# Patient Record
Sex: FEMALE | Employment: OTHER | URBAN - METROPOLITAN AREA
[De-identification: names, ages, dates, MRNs, and addresses within clinical notes are randomized per-mention and may not be internally consistent; named-entity substitution may affect disease eponyms.]

---

## 2017-03-07 ENCOUNTER — RECORDS - HEALTHEAST (OUTPATIENT)
Dept: LAB | Facility: CLINIC | Age: 77
End: 2017-03-07

## 2017-03-07 LAB
CHOLEST SERPL-MCNC: 188 MG/DL
FASTING STATUS PATIENT QL REPORTED: NORMAL
HDLC SERPL-MCNC: 65 MG/DL
LDLC SERPL CALC-MCNC: 93 MG/DL
TRIGL SERPL-MCNC: 148 MG/DL

## 2018-03-27 ENCOUNTER — RECORDS - HEALTHEAST (OUTPATIENT)
Dept: LAB | Facility: CLINIC | Age: 78
End: 2018-03-27

## 2018-03-27 LAB
ANION GAP SERPL CALCULATED.3IONS-SCNC: 13 MMOL/L (ref 5–18)
BUN SERPL-MCNC: 17 MG/DL (ref 8–28)
CALCIUM SERPL-MCNC: 10.5 MG/DL (ref 8.5–10.5)
CHLORIDE BLD-SCNC: 101 MMOL/L (ref 98–107)
CO2 SERPL-SCNC: 24 MMOL/L (ref 22–31)
CREAT SERPL-MCNC: 0.82 MG/DL (ref 0.6–1.1)
GFR SERPL CREATININE-BSD FRML MDRD: >60 ML/MIN/1.73M2
GLUCOSE BLD-MCNC: 96 MG/DL (ref 70–125)
POTASSIUM BLD-SCNC: 4 MMOL/L (ref 3.5–5)
SODIUM SERPL-SCNC: 138 MMOL/L (ref 136–145)

## 2019-01-03 ENCOUNTER — RECORDS - HEALTHEAST (OUTPATIENT)
Dept: LAB | Facility: CLINIC | Age: 79
End: 2019-01-03

## 2019-01-03 LAB
ANION GAP SERPL CALCULATED.3IONS-SCNC: 7 MMOL/L (ref 5–18)
BUN SERPL-MCNC: 17 MG/DL (ref 8–28)
CALCIUM SERPL-MCNC: 11 MG/DL (ref 8.5–10.5)
CHLORIDE BLD-SCNC: 104 MMOL/L (ref 98–107)
CO2 SERPL-SCNC: 30 MMOL/L (ref 22–31)
CREAT SERPL-MCNC: 0.83 MG/DL (ref 0.6–1.1)
GFR SERPL CREATININE-BSD FRML MDRD: >60 ML/MIN/1.73M2
GLUCOSE BLD-MCNC: 101 MG/DL (ref 70–125)
POTASSIUM BLD-SCNC: 4.6 MMOL/L (ref 3.5–5)
SODIUM SERPL-SCNC: 141 MMOL/L (ref 136–145)

## 2019-01-22 ENCOUNTER — RECORDS - HEALTHEAST (OUTPATIENT)
Dept: LAB | Facility: CLINIC | Age: 79
End: 2019-01-22

## 2019-01-22 LAB — CALCIUM SERPL-MCNC: 10.6 MG/DL (ref 8.5–10.5)

## 2019-04-01 ENCOUNTER — RECORDS - HEALTHEAST (OUTPATIENT)
Dept: LAB | Facility: CLINIC | Age: 79
End: 2019-04-01

## 2019-04-01 LAB
ANION GAP SERPL CALCULATED.3IONS-SCNC: 11 MMOL/L (ref 5–18)
BUN SERPL-MCNC: 13 MG/DL (ref 8–28)
CALCIUM SERPL-MCNC: 10.9 MG/DL (ref 8.5–10.5)
CHLORIDE BLD-SCNC: 104 MMOL/L (ref 98–107)
CO2 SERPL-SCNC: 27 MMOL/L (ref 22–31)
CREAT SERPL-MCNC: 0.77 MG/DL (ref 0.6–1.1)
GFR SERPL CREATININE-BSD FRML MDRD: >60 ML/MIN/1.73M2
GLUCOSE BLD-MCNC: 90 MG/DL (ref 70–125)
POTASSIUM BLD-SCNC: 4 MMOL/L (ref 3.5–5)
SODIUM SERPL-SCNC: 142 MMOL/L (ref 136–145)

## 2019-07-24 ENCOUNTER — RECORDS - HEALTHEAST (OUTPATIENT)
Dept: LAB | Facility: CLINIC | Age: 79
End: 2019-07-24

## 2019-07-24 LAB
ALBUMIN SERPL-MCNC: 3.9 G/DL (ref 3.5–5)
ANION GAP SERPL CALCULATED.3IONS-SCNC: 12 MMOL/L (ref 5–18)
BUN SERPL-MCNC: 12 MG/DL (ref 8–28)
CALCIUM SERPL-MCNC: 10.6 MG/DL (ref 8.5–10.5)
CALCIUM SERPL-MCNC: 10.7 MG/DL (ref 8.5–10.5)
CHLORIDE BLD-SCNC: 104 MMOL/L (ref 98–107)
CO2 SERPL-SCNC: 26 MMOL/L (ref 22–31)
CREAT SERPL-MCNC: 0.8 MG/DL (ref 0.6–1.1)
CREAT SERPL-MCNC: 0.81 MG/DL (ref 0.6–1.1)
GFR SERPL CREATININE-BSD FRML MDRD: >60 ML/MIN/1.73M2
GFR SERPL CREATININE-BSD FRML MDRD: >60 ML/MIN/1.73M2
GLUCOSE BLD-MCNC: 95 MG/DL (ref 70–125)
PHOSPHATE SERPL-MCNC: 3 MG/DL (ref 2.5–4.5)
POTASSIUM BLD-SCNC: 4.8 MMOL/L (ref 3.5–5)
PTH-INTACT SERPL-MCNC: 36 PG/ML (ref 10–86)
SODIUM SERPL-SCNC: 142 MMOL/L (ref 136–145)

## 2019-07-25 LAB — 25(OH)D3 SERPL-MCNC: 38.3 NG/ML (ref 30–80)

## 2019-10-23 ENCOUNTER — RECORDS - HEALTHEAST (OUTPATIENT)
Dept: LAB | Facility: CLINIC | Age: 79
End: 2019-10-23

## 2019-10-23 LAB
ANION GAP SERPL CALCULATED.3IONS-SCNC: 12 MMOL/L (ref 5–18)
BUN SERPL-MCNC: 12 MG/DL (ref 8–28)
CALCIUM SERPL-MCNC: 10.2 MG/DL (ref 8.5–10.5)
CALCIUM SERPL-MCNC: 10.4 MG/DL (ref 8.5–10.5)
CHLORIDE BLD-SCNC: 103 MMOL/L (ref 98–107)
CO2 SERPL-SCNC: 26 MMOL/L (ref 22–31)
CREAT SERPL-MCNC: 0.78 MG/DL (ref 0.6–1.1)
CREAT SERPL-MCNC: 0.79 MG/DL (ref 0.6–1.1)
GFR SERPL CREATININE-BSD FRML MDRD: >60 ML/MIN/1.73M2
GFR SERPL CREATININE-BSD FRML MDRD: >60 ML/MIN/1.73M2
GLUCOSE BLD-MCNC: 92 MG/DL (ref 70–125)
PHOSPHATE SERPL-MCNC: 3.3 MG/DL (ref 2.5–4.5)
POTASSIUM BLD-SCNC: 4.4 MMOL/L (ref 3.5–5)
PTH-INTACT SERPL-MCNC: 33 PG/ML (ref 10–86)
SODIUM SERPL-SCNC: 141 MMOL/L (ref 136–145)

## 2019-10-24 LAB — 25(OH)D3 SERPL-MCNC: 41.7 NG/ML (ref 30–80)

## 2020-02-18 RX ORDER — MONTELUKAST SODIUM 10 MG/1
10 TABLET ORAL EVERY EVENING
COMMUNITY

## 2020-02-18 RX ORDER — DOXAZOSIN 8 MG/1
8 TABLET ORAL DAILY
COMMUNITY

## 2020-02-18 RX ORDER — ALBUTEROL SULFATE 0.83 MG/ML
3 SOLUTION RESPIRATORY (INHALATION) EVERY 6 HOURS PRN
COMMUNITY

## 2020-02-18 RX ORDER — EPINEPHRINE 0.3 MG/.3ML
0.3 INJECTION SUBCUTANEOUS PRN
COMMUNITY

## 2020-02-18 RX ORDER — HYDROCHLOROTHIAZIDE 25 MG/1
25 TABLET ORAL DAILY
COMMUNITY

## 2020-02-18 RX ORDER — ALBUTEROL SULFATE 90 UG/1
2 AEROSOL, METERED RESPIRATORY (INHALATION) 4 TIMES DAILY
COMMUNITY

## 2020-02-18 RX ORDER — FLUTICASONE PROPIONATE 220 UG/1
1 AEROSOL, METERED RESPIRATORY (INHALATION) 2 TIMES DAILY
COMMUNITY

## 2020-02-18 NOTE — PROGRESS NOTES
MTM ENCOUNTER  SUBJECTIVE/OBJECTIVE:                           Michelle Handley is a 79 year old female coming in for an initial visit.  She was referred to me from Dr. Contreras.     Chief Complaint: Medication review.  Pt was referred for help with blood pressure medication selection.    Allergies/ADRs: Amoxicillin - Hives, Biaxin - Stom, Doxycycline - Stom, Sulfa - Hives, Serevent - Hives, Pneumovax 23, Theochron - Hives, ibuprofen, losartan - hives, ASA Baby - facial swelling  Tobacco: Never smoker  Alcohol: none  Caffeine: 1-3 cups/day of coffee  Activity: Likes to go on walks most days  PMH: Reviewed in ECW and Epic.    Medication Adherence/Access:  no issues reported, rarely forgets doses of meds.    Hypertension:   Current medications include Doxazosin 8 mg daily, hydrochlorothiazide 25 mg daily.  Patient does self-monitor BP. Home BP monitoring in range of 130-150's systolic over 80-90's diastolic.  Some systolic BP readings have been in the 110-120's but not regularly.  Has an arm BP monitor and has brought it into the clinic to compare to clinic machine.  Patient reports no current medication side effects. Discussed possible medication options for BP and retrial on losartan.  Pt would prefer to avoid higher dose of hydrochlorothiazide due to frequent urination.  Allergy consult:  Pt saw allergist on 11/26/19, Dr. Jennifer Boeckman.  She was assessed her reactions to hydrochlorothiazide, doxasozin, and losartan due to history of hives.  Pt has been on Losartan for about 6 months, and then started breaking out in hives.  The hives was ongoing for 3 months.  Pt reports she was breaking out in hives on higher doses of doxazosin 16 mg after they had stopped the losartan.  They reduced the doxazosin dose, and pt continued to break out in hives.  Pt also has intermittent angioedema, with swelling of the face.  This occurred once without any clear allergen or exposure.  Another instance was attributed to  aspirin.  Dr. Boeckmen noted the chronic hives is unlikely an allergy to both doxazosin and losartan.  Pt continued to have hives despite being off losartan for several months, and reducing the dose of doxazosin.  Dr. Boeckmen felt her diagnosis was likely chronic idiopathic urticaria, and would be beneficial to attempt a drug re-trial of losartan or try an new BP med.  She recommended the pt take allegra 180 mg or cetirizine 10 mg BID.  Hx on amlodipine, but had peripheral edema.    Chronic idiopathic urticaria:  Currently taking Cetirizine 20 mg once daily in the morning.  Pt had also been on Allergra along with this, but has GI upset with it and diarrhea.  Right now she is just taking cetirizine in the morning, hasn't tried splitting the dose.  They are looking into getting Xolair once monthly covered by insurance, but the patient is worried about risk of anaphylaxis with Xolair as well.    Patient uses hydrochortizone 1% OTC cream PRN for flare ups of the itching, this helps a lot when needed.  Patient is still having urticaria and redness.  This is the worst when she wakes up in the morning.  Pt reports her symptoms are tolerable at this time, and would prefer to added another antihistamine before trying Xolair.   Hx on allegra, benadryl, claritin.  Not very effective.     Asthma:   Current asthma medications: Short-Acting Bronchodilator: Albuterol MDI, Nebs and pt reports using a few times per year.  Rarely has to use them.  ICS - Flovent 220 mcg 1 puff(s) twice daily.  Montelukast (Singulair) 10 mg once daily. Pt rinses their mouth after using steroid inhaler.   Asthma triggers include: upper respiratory infections and exercise or sports.  Pt reports the following symptoms: none.  AAP on file: YES  ACT Total Scores 1/3/2019   ACT TOTAL SCORE (Goal Greater than or Equal to 20) 24   In the past 12 months, how many times did you visit the emergency room for your asthma without being admitted to the hospital? 0    In the past 12 months, how many times were you hospitalized overnight because of your asthma? 0        Spirometry (12/27/11):          Anaphylaxis:   Patient has an Epi-pen 0.3 injector available if needed.  Reports severe swelling and redness at site of bee sting.  Pt hasn't had to use it.    Supplements/OTCs:   Currently taking a multivitamin once daily, vitamin D 1000 international unit(s) daily (isn't sure on dose).   Diet: large dinner, meat and potatoes.  Small breakfasts.  Love potatoes.  Lunch usually a salad and soup. Gets some calcium in diet with salads and occaisional ice cream.  Was on calcium supplement in the past but her calcium level was high so it was stopped.         VITAMIN D, 25-HYDROXY (screening) - 10/23/2019      NAME VALUE REFERENCE RANGE    VITAMIN D 25-HYDROXY 41.7 30.0-80.0 (ng/mL)       PARATHYROID HORM. MINERALS - 10/23/2019      NAME VALUE REFERENCE RANGE    PARATHYROID HORMONE INTACT 33 10-86 (pg/mL)    CALCIUM 10.4 8.5-10.5 (mg/dL)    PHOSPHORUS 3.3 2.5-4.5 (mg/dL)    CREATININE 0.78 0.60-1.10 (mg/dL)    GFR MDRD AF AMER >60 >60 (mL/min/1.73m2)    GFR MDRD NON AF AMER >60 >60 (mL/min/1.73m2)       BASIC METABOLIC PROFILE - 10/23/2019      NAME VALUE REFERENCE RANGE    SODIUM 141 136-145 (mmol/L)    POTASSIUM 4.4 3.5-5.0 (mmol/L)    CHLORIDE 103  (mmol/L)    CO2 26 22-31 (mmol/L)    ANION GAP, CALCULATION 12 5-18 (mmol/L)    GLUCOSE 92  (mg/dL)    CALCIUM 10.2 8.5-10.5 (mg/dL)    BLOOD UREA NITROGEN 12 8-28 (mg/dL)    CREATININE 0.79 0.60-1.10 (mg/dL)    GFR MDRD AF AMER >60 >60 (mL/min/1.73m2)    GFR MDRD NON AF AMER >60 >60 (mL/min/1.73m2)     Fasting Glucose reference range is 70-99 mg/dL per      American Diabetes Association (ADA) guidelines.    PERFORMING LAB: [facility] [address] [place] [id number]   Michelle Handley F, 1940 Accession ID: [id number]       Today's Vitals: BP (!) 162/82   Pulse 82   Wt 181 lb 9.6 oz (82.4 kg)       ASSESSMENT:                               Medication Adherence: good, no issues identified    Hypertension:   Needs Improvement. Patient is not meeting BP goal of < 140/80mmHg.  Discussed options of re-trying Losartan or higher dose of doxazosin again for BP.  Discussed that pt will need to monitor her symptoms of urticaria, hives, and watch for a severe reaction.    Could also consider an additional medication such as metoprolol ER, clonidine, or hydralazine.  All three are more likely to cause hypotension and orthostatic symptoms.  Pt's reaction/hives to losartan was not likely a drug allergy, as noted by the allergist.  Patient had urticaria and hives despite being off the losartan for months, and the symptoms flared up 6 months after starting the medication. Pt declines restarting the losartan in clinic and would prefer to do this at home.  She never had dyspnea, facial swelling, or facial symptoms when on losartan.    Will avoid calcium channel blockers due to risk of peripheral edema, similar risk with all in this drug class.    Chronic idiopathic urticaria:  Needs improvement.   Patient still having urticaria and symptoms of hives despite taking cetirizine 20 mg per day.  She had adverse effects when taking Allegra in addition to current regimen, but may be able to try alternative like loratadine 10 mg added to cetirizine.  Patient would likely benefit from taking cetirizine 10 mg twice daily instead of both doses in the morning. She may get better symptom control with splitting the dose.  If pt's urticaria and hives flare up with BP medications, she could also try adding the loratadine 10 mg.  Another option to consider would be low dose doxepin (3-6mg) at bedtime for urticaria.  Typically lower doses are needed for urticaria, and anticholinergic side effects are dose dependent.  Doses below 6mg are generally regarded as safe.    Xolair would be an option as well, but pt would like to try alternatives prior to trying  this due to risk of anaphylaxis.      Asthma:   Stable. Up to date and at goal of ACT > or equal to 20.  At next apt will have her complete updated ACT.    Anaphylaxis:   Stable.    Supplements/OTCs:  Stable.    PLAN:                              1) Start Losartan 25 mg once daily.  Monitor for symptoms of hives/urticaria flaring up.    2) Change Cetirizine to 10 mg twice daily, instead of taking both tablets in the morning.  3) We will follow up in 1 month on blood pressure and symptoms of hives.   Will recheck Scr after starting losartan at that time as well.      I spent 60 minutes with this patient today (an extra 15 minutes was spent creating the Medication Action Plan). All changes were made via verbal approval with Dr. Contreras. A copy of the visit note was provided to the patient's primary care provider.    Will follow up in 3-4 weeks.    The patient was mailed a summary of these recommendations.     Katrin Mullins, PharmD  Medication Therapy Management Pharmacist  Pager: [phone]

## 2020-02-25 ENCOUNTER — OFFICE VISIT (OUTPATIENT)
Dept: PHARMACY | Facility: PHYSICIAN GROUP | Age: 80
End: 2020-02-25
Payer: COMMERCIAL

## 2020-02-25 VITALS — HEART RATE: 82 BPM | SYSTOLIC BLOOD PRESSURE: 162 MMHG | WEIGHT: 181.6 LBS | DIASTOLIC BLOOD PRESSURE: 82 MMHG

## 2020-02-25 DIAGNOSIS — Z78.9 TAKES DIETARY SUPPLEMENTS: ICD-10-CM

## 2020-02-25 DIAGNOSIS — I10 BENIGN ESSENTIAL HYPERTENSION: Primary | ICD-10-CM

## 2020-02-25 DIAGNOSIS — L50.1 CHRONIC IDIOPATHIC URTICARIA: ICD-10-CM

## 2020-02-25 DIAGNOSIS — Z91.038 HISTORY OF ANAPHYLACTIC SHOCK DUE TO INSECT STING: ICD-10-CM

## 2020-02-25 DIAGNOSIS — J45.20 MILD INTERMITTENT ASTHMA WITHOUT COMPLICATION: ICD-10-CM

## 2020-02-25 PROCEDURE — 99605 MTMS BY PHARM NP 15 MIN: CPT | Performed by: PHARMACIST

## 2020-02-25 PROCEDURE — 99607 MTMS BY PHARM ADDL 15 MIN: CPT | Performed by: PHARMACIST

## 2020-02-25 RX ORDER — CETIRIZINE HYDROCHLORIDE 10 MG/1
10 TABLET ORAL 2 TIMES DAILY
COMMUNITY

## 2020-02-25 RX ORDER — FAMOTIDINE 20 MG
1000 TABLET ORAL DAILY
COMMUNITY

## 2020-02-25 RX ORDER — BENZOCAINE/MENTHOL 6 MG-10 MG
LOZENGE MUCOUS MEMBRANE 3 TIMES DAILY PRN
COMMUNITY

## 2020-02-25 RX ORDER — LOSARTAN POTASSIUM 25 MG/1
25 TABLET ORAL DAILY
COMMUNITY

## 2020-02-25 NOTE — LETTER
"     [facility] - [facility] MTM     Date: 2020    Michelle Handley  [address] [address]    Dear Ms. Handley,    Thank you for talking with me on 2020 about your health and medications. Medicare s MTM (Medication Therapy Management) program helps you understand your medications and use them safely.      This letter includes an action plan (Medication Action Plan) and medication list (Personal Medication List). The action plan has steps you should take to help you get the best results from your medications. The medication list will help you keep track of your medications and how to use them the right way.       Have your action plan and medication list with you when you talk with your doctors, pharmacists, and other healthcare providers in your care team.     Ask your doctors, pharmacists, and other healthcare providers to update the action plan and medication list at every visit.     Take your medication list with you if you go to the hospital or emergency room.     Give a copy of the action plan and medication list to your family or caregivers.     If you want to talk about this letter or any of the papers with it, please call   [phone].   We look forward to working with you, your doctors, and other healthcare providers to help you stay healthy.     Sincerely,    Katrin Mullins [facility]      Enclosed: Medication Action Plan and Personal Medication List    MEDICATION ACTION PLAN FOR Michelle Handley,  1940     This action plan will help you get the best results from your medications if you:   1. Read \"What we talked about.\"   2. Take the steps listed in the \"What I need to do\" boxes.   3. Fill in \"What I did and when I did it.\"   4. Fill in \"My follow-up plan\" and \"Questions I want to ask.\"     Have this action plan with you when you talk with your doctors, pharmacists, and other healthcare providers in your care team. Share this with your family or caregivers too.  DATE " PREPARED: 2020  What we talked about: We spoke about possible medication options for treating your high blood pressure                                                  What I need to do: Start taking Losartan 25 mg once daily.  Monitor for increase or changes in your hives and itching.         What I did and when I did it:                                              What we talked about: We discussed that your symptoms of chronic hives and itching may be better controlled if you split your dose of cetirizine to twice daily.  This may help control your symptoms overnight and in the morning.                                                  What I need to do: Change how you take cetirizine, and take 10 mg twice daily instead of both tablets in the morning.       What I did and when I did it:                                                 My follow-up plan:                 Questions I want to ask:              If you have any questions about your action plan, call Katrin Mullins [facility], Phone: [phone] , Monday-Friday 8-4:30pm.           MEDICATION LIST FOR Michelle Handley,  1940     This medication list was made for you after we talked. We also used information from your doctor's chart.      Use blank rows to add new medications. Then fill in the dates you started using them.    Cross out medications when you no longer use them. Then write the date and why you stopped using them.    Ask your doctors, pharmacists, and other healthcare providers to update this list at every visit. Keep this list up-to-date with:       Prescription medications    Over the counter drugs     Herbals    Vitamins    Minerals      If you go to the hospital or emergency room, take this list with you. Share this with your family or caregivers too.     DATE PREPARED: 2020  Allergies or side effects: Amoxicillin; Baby aspirin [aspirin]; Biaxin [clarithromycin]; Doxycycline; Ibuprofen; Losartan; Pneumovax [pneumococcal  polysaccharides]; Serevent [salmeterol]; Sulfa drugs; and Theochron [theophylline]     Medication:  ALBUTEROL SULFATE (2.5 MG/3ML) 0.083% IN NEBU      How I use it:  Take 3 mLs by nebulization every 6 hours as needed      Why I use it:      Prescriber:  Patient Reported      Date I started using it:       Date I stopped using it:         Why I stopped using it:            Medication:  ALBUTEROL SULFATE  (90 BASE) MCG/ACT IN AERS      How I use it:  Inhale 2 puffs into the lungs 4 times daily      Why I use it:  Asthma    Prescriber:  Dr. Contreras      Date I started using it:       Date I stopped using it:         Why I stopped using it:            Medication:  CETIRIZINE HCL 10 MG PO TABS      How I use it:  Take 10 mg by mouth 2 times daily      Why I use it:  Chronic Urticaria    Prescriber:  Dr. Contreras      Date I started using it:       Date I stopped using it:         Why I stopped using it:            Medication:  DOXAZOSIN MESYLATE 8 MG PO TABS      How I use it:  Take 8 mg by mouth daily      Why I use it:  High blood pressure    Prescriber:  Dr. Contreras      Date I started using it:       Date I stopped using it:         Why I stopped using it:            Medication:  EPINEPHRINE 0.3 MG/0.3ML IJ SOAJ      How I use it:  Inject 0.3 mg into the muscle as needed      Why I use it: Allergic reaction    Prescriber:  Dr. Contreras      Date I started using it:       Date I stopped using it:         Why I stopped using it:            Medication:  FLUTICASONE PROPIONATE  MCG/ACT IN AERO      How I use it:  Inhale 1 puff into the lungs 2 times daily       Why I use it:  Asthma    Prescriber:  Dr. Contreras      Date I started using it:       Date I stopped using it:         Why I stopped using it:            Medication:  HYDROCHLOROTHIAZIDE 25 MG PO TABS      How I use it:  Take 25 mg by mouth daily      Why I use it:  High blood pressure    Prescriber:  Dr. Contreras      Date I started using it:        Date I stopped using it:         Why I stopped using it:            Medication:  HYDROCORTISONE 1 % EX CREA      How I use it:  Apply topically 3 times daily as needed for itching      Why I use it:  Chronic Urticaria    Prescriber:  Patient Reported      Date I started using it:       Date I stopped using it:         Why I stopped using it:            Medication:  MONTELUKAST SODIUM 10 MG PO TABS      How I use it:  Take 10 mg by mouth every evening      Why I use it:  Asthma    Prescriber:  Dr. Contreras      Date I started using it:       Date I stopped using it:         Why I stopped using it:            Medication:  MULTIVITAMIN ADULT PO      How I use it:  Take 1 capsule by mouth daily      Why I use it:  Supplements    Prescriber:  Patient Reported      Date I started using it:       Date I stopped using it:         Why I stopped using it:            Medication:  VITAMIN D (CHOLECALCIFEROL) 25 MCG (1000 UT) PO CAPS      How I use it:  Take 1,000 Units by mouth daily      Why I use it:  Supplements    Prescriber:  Patient Reported      Date I started using it:       Date I stopped using it:         Why I stopped using it:            Medication:         How I use it:         Why I use it:      Prescriber:         Date I started using it:       Date I stopped using it:         Why I stopped using it:            Medication:         How I use it:         Why I use it:      Prescriber:         Date I started using it:       Date I stopped using it:         Why I stopped using it:            Medication:         How I use it:         Why I use it:      Prescriber:         Date I started using it:       Date I stopped using it:         Why I stopped using it:              Other Information:     If you have any questions about your action plan, call [phone].    According to the Paperwork Reduction Act of 1995, no persons are required to respond to a collection of information unless it displays a valid OMB control  number. The valid B number for this information collection is 8002-8644. The time required to complete this information collection is estimated to average 40 minutes per response, including the time to review instructions, searching existing data resources, gather the data needed, and complete and review the information collection. If you have any comments concerning the accuracy of the time estimate(s) or suggestions for improving this form, please write to: CMS, Attn: [employer] Reports Clearance Officer, [address] [place] [phone].

## 2020-02-25 NOTE — PATIENT INSTRUCTIONS
Recommendations from today's MTM visit:                                                    MTM (medication therapy management) is a service provided by a clinical pharmacist designed to help you get the most of out of your medicines.   Today we reviewed what your medicines are for, how to know if they are working, that your medicines are safe and how to make your medicine regimen as easy as possible.      Dr. Contreras agreed with making the following changes:    1) Start Losartan 25 mg once daily.  Monitor for symptoms of hives/urticaria flaring up.  Please let Dr. Contreras or I know if you have any issues with this.    2) Change Cetirizine to 10 mg twice daily, instead of taking both tablets in the morning.  This may be more effective for managing your symptoms overnight and in the morning.    3) We will follow up in 1 month on blood pressure and symptoms of hives.       It was great to speak with you today.  I value your experience and would be very thankful for your time with providing feedback on our clinic survey. You may receive a survey via email or text message in the next few days.     Next MTM visit: 3/24/2020 at 9:30 AM with Katrin    To schedule another MTM appointment, please call the clinic directly or you may call the MTM scheduling line at [phone] or toll-free at [phone].     My Clinical Pharmacist's contact information:                                                      It was a pleasure talking with you today!  Please feel free to contact me with any questions or concerns you have.      Katrin Mullins, PharmD  Medication Therapy Management Pharmacist  Pager: [phone]         Medication List          Accurate as of February 25, 2020 11:02 AM. If you have any questions, ask your nurse or doctor.            CONTINUE taking these medications      Morning Afternoon Evening Bedtime   * albuterol (2.5 MG/3ML) 0.083% neb solution  Also known as:  PROVENTIL  Take 3 mLs by nebulization every  6 hours as needed            * albuterol 108 (90 Base) MCG/ACT inhaler  Also known as:  PROAIR HFA/PROVENTIL HFA/VENTOLIN HFA  Inhale 2 puffs into the lungs 4 times daily  Doctor's comments:  Pharmacy may dispense brand covered by insurance (Proair, or proventil or ventolin or generic albuterol inhaler)            cetirizine 10 MG tablet  Also known as:  zyrTEC  Take 10 mg by mouth 2 times daily                doxazosin 8 MG tablet  Also known as:  CARDURA  Take 8 mg by mouth daily              EPINEPHrine 0.3 MG/0.3ML injection 2-pack  Also known as:  ANY BX GENERIC EQUIV  Inject 0.3 mg into the muscle as needed            fluticasone 220 MCG/ACT inhaler  Also known as:  FLOVENT HFA  Inhale 1 puff into the lungs 2 times daily                hydrochlorothiazide 25 MG tablet  Also known as:  HYDRODIURIL  Take 25 mg by mouth daily              hydrocortisone 1 % external cream  Also known as:  CORTAID  Apply topically 3 times daily as needed for itching            losartan 25 MG tablet  Also known as:  COZAAR  Take 25 mg by mouth daily              montelukast 10 MG tablet  Also known as:  SINGULAIR  Take 10 mg by mouth every evening              MULTIVITAMIN ADULT PO  Take 1 capsule by mouth daily              Vitamin D (Cholecalciferol) 25 MCG (1000 UT) Caps  Take 1,000 Units by mouth daily                  * This list has 2 medication(s) that are the same as other medications prescribed for you. Read the directions carefully, and ask your doctor or other care provider to review them with you.

## 2020-02-26 ASSESSMENT — ASTHMA QUESTIONNAIRES: ACT_TOTALSCORE: 24

## 2020-03-19 ENCOUNTER — ALLIED HEALTH/NURSE VISIT (OUTPATIENT)
Dept: PHARMACY | Facility: PHYSICIAN GROUP | Age: 80
End: 2020-03-19
Payer: COMMERCIAL

## 2020-03-19 DIAGNOSIS — I10 BENIGN ESSENTIAL HYPERTENSION: Primary | ICD-10-CM

## 2020-03-19 DIAGNOSIS — L50.1 CHRONIC IDIOPATHIC URTICARIA: ICD-10-CM

## 2020-03-19 PROCEDURE — 99607 MTMS BY PHARM ADDL 15 MIN: CPT | Performed by: PHARMACIST

## 2020-03-19 PROCEDURE — 99606 MTMS BY PHARM EST 15 MIN: CPT | Performed by: PHARMACIST

## 2020-03-19 NOTE — PROGRESS NOTES
MTM ENCOUNTER  SUBJECTIVE/OBJECTIVE:                Michelle Handley is a 79 year old female called for a follow-up visit.  She was referred to me from Dr. Contreras.      Note:  We had an in clinic appointment scheduled next week for a BP check but due to COVID19 pandemic rescheduled this to a phone visit today.    Chief Complaint: Follow up from MTM visit on 2/25/2020.  Follow up on hypertension and urticaria after starting Losartan.  Pt would like to get refills on losartan and doxazosin.    Medication Adherence/Access:  no issues reported    Hypertension:   Current medications include Doxazosin 8 mg daily, Losartan 25 mg daily, and hydrochlorothiazide 25 mg daily.  Patient does self-monitor BP. Home Home BP monitoring: :  134/77, 131/81, 165/78 (highest), 147/88, 124/85.  Most of them have been below 130 or 135 systolic.    Has an arm BP monitor and has brought it into the clinic to compare to clinic machine.  Patient reports no current medication side effects since restarting the losartan.  Pt mentions that the hives haven't changed since resuming therapy.  Allergy consult:  Pt saw allergist on 11/26/19, Dr. Jennifer Boeckman. Dr. Boeckmen noted the chronic hives is unlikely an allergy to both doxazosin and losartan.   Hx on amlodipine, but had peripheral edema.     Chronic idiopathic urticaria:  Currently taking Cetirizine 10 mg twice daily.  Pt had also been on Allergra along with this, but has GI upset with it and diarrhea.  Pt reports splitting the dose of cetirizine has been more effective then taking both doses in the morning.  They are looking into getting Xolair once monthly covered by insurance, but the patient is worried about risk of anaphylaxis with Xolair as well.    Patient uses hydrochortizone 1% OTC cream PRN for flare ups of the itching, this helps a lot when needed.  Patient is still having urticaria and redness.  This is the worst when she wakes up in the morning.  Pt reports her symptoms are  tolerable at this time, and would prefer to added another antihistamine before trying Xolair.   Hx on allegra, benadryl, claritin.  Not very effective.     Today's Vitals: There were no vitals taken for this visit. - telephone encounter.      ASSESSMENT:                  Medication Adherence: good, no issues identified    Hypertension:   Improving. Patient is meeting BP goal of < 140/80mmHg, with home BP readings.  Had a few over goal but her BP seems to he improving.  For now we could continue her current doses, and consider at next in-clinic follow up if a higher dose of losartan is needed.  When it is safe for patient to come back into the clinic, can schedule BP check.  Would be safer at this time for patient to stay home until the pandemic is improved.       Chronic idiopathic urticaria:  Improving.  Patient feels splitting the dose of cetirizine to twice daily dosing has helped with managing her symptoms.   Still has the hives but is manageable for now.      Xolair would be an option as well, but pt would like to try alternatives prior to trying this due to risk of anaphylaxis.       PLAN:                  1)  Continue current medications.  Sent in refills of losartan and doxazosin.    I spent 30 minutes with this patient today. I offer these suggestions for consideration by Dr. Contreras. A copy of the visit note was provided to the patient's primary care provider.     Will follow up in 2 months.    The patient was mailed a summary of these recommendations.     Katrin Mullins, PharmD  Medication Therapy Management Pharmacist  Pager: [phone]

## 2020-04-28 ENCOUNTER — ALLIED HEALTH/NURSE VISIT (OUTPATIENT)
Dept: PHARMACY | Facility: PHYSICIAN GROUP | Age: 80
End: 2020-04-28
Payer: COMMERCIAL

## 2020-04-28 DIAGNOSIS — L50.1 CHRONIC IDIOPATHIC URTICARIA: ICD-10-CM

## 2020-04-28 DIAGNOSIS — I10 BENIGN ESSENTIAL HYPERTENSION: Primary | ICD-10-CM

## 2020-04-28 PROCEDURE — 99607 MTMS BY PHARM ADDL 15 MIN: CPT | Performed by: PHARMACIST

## 2020-04-28 PROCEDURE — 99606 MTMS BY PHARM EST 15 MIN: CPT | Performed by: PHARMACIST

## 2020-04-28 NOTE — PROGRESS NOTES
MTM ENCOUNTER  SUBJECTIVE/OBJECTIVE:                           Michelle Handley is a 79 year old female called for a follow-up visit. She was referred to me from Dr. Contreras.  Today's visit is a follow-up MTM visit from 3/19/2020.     Patient consented to a telehealth visit: yes  Telemedicine Visit Details  Type of service:  Telephone visit  Start Time: 9:06  End Time: 9:58  Originating Location (pt. Location): Home  Distant Location (provider location):  [facility]  Mode of Communication:  Telephone    Chief Complaint: Follow up on blood pressure.    Medication Adherence/Access: no issues reported    Hypertension:   Current medications include Doxazosin 8 mg daily, Losartan 25 mg daily, and hydrochlorothiazide 25 mg daily.    Patient does self-monitor BP.  She and her  are at their cabin [place], and she has noticed her BP readings are very good lately.  Hasn't seen any BP over 150 systolic.  BP: today 129/85, 146/88, 145/89, 142/87, 104/71, 136/83, 119/73, 120/71.  Has an arm BP monitor and has brought it into the clinic to compare to clinic machine.  Patient reports no current medication side effects since restarting the losartan.  Pt mentions that the hives haven't changed since resuming therapy on losartan.  Allergy consult:  Hx on amlodipine, but had peripheral edema.     Chronic idiopathic urticaria:  Currently taking Cetirizine 10 mg twice daily.  Pt had also been on Allegra along with this, but has GI upset with it and diarrhea.  Pt reports splitting the dose of cetirizine has been more effective then taking both doses in the morning.  They are looking into getting Xolair once monthly covered by insurance, but the patient is worried about risk of anaphylaxis with Xolair as well.  Patient feels the urticaria is  manageable right now.  Her face hasn't been swelling up.  Patient uses hydrochortizone 1% OTC cream PRN for flare ups of the itching, this helps a lot when needed.   Patient is still having urticaria and redness.  This is the worst when she wakes up in the morning.  Pt reports her symptoms are tolerable at this time, and would prefer to added another antihistamine before trying Xolair.   Hx on allegra, benadryl, claritin.  Not very effective.     Today's Vitals: There were no vitals taken for this visit.  - phone appointment      ASSESSMENT:                              Medication Adherence: good, no issues identified    Hypertension:   Improving. Patient is meeting BP goal of < 140/80mmHg, with home BP readings.  Had a few over goal but her BP seems to he improving.  For now we could continue her current doses, and consider at next in-clinic follow up if a higher dose of losartan if needed.  When it is safe for patient to come back into the clinic, can schedule BP check.  Would be safer at this time for patient to stay home until the pandemic is improved.       Chronic idiopathic urticaria:  Stable.  Patient feels splitting the dose of cetirizine to twice daily dosing has helped with managing her symptoms.   Still has the hives but is manageable for now.      Xolair would be an option as well, but pt would like to try alternatives prior to trying this due to risk of anaphylaxis.      PLAN:                            1.  Continue Losartan 25 mg daily, Doxasoxin 8 mg daily, and Hydrochlorothiazide 25 mg daily.  2.  Continue home BP monitoring, and schedule blood pressure check with Dr. Contreras in clinic in 3 months.    I spent 30 minutes with this patient today. I offer these suggestions for consideration by Dr. Contreras. A copy of the visit note was provided to the patient's primary care provider.    Will follow up for yearly medication review.    The patient declined a summary of these recommendations.     Katrin Mullins, PharmD  Medication Therapy Management Pharmacist  Pager: [phone]

## 2021-01-21 ENCOUNTER — RECORDS - HEALTHEAST (OUTPATIENT)
Dept: LAB | Facility: CLINIC | Age: 81
End: 2021-01-21

## 2021-01-21 LAB
ANION GAP SERPL CALCULATED.3IONS-SCNC: 8 MMOL/L (ref 5–18)
BUN SERPL-MCNC: 11 MG/DL (ref 8–28)
CALCIUM SERPL-MCNC: 10.4 MG/DL (ref 8.5–10.5)
CHLORIDE BLD-SCNC: 104 MMOL/L (ref 98–107)
CO2 SERPL-SCNC: 30 MMOL/L (ref 22–31)
CREAT SERPL-MCNC: 0.78 MG/DL (ref 0.6–1.1)
GFR SERPL CREATININE-BSD FRML MDRD: >60 ML/MIN/1.73M2
GLUCOSE BLD-MCNC: 92 MG/DL (ref 70–125)
POTASSIUM BLD-SCNC: 4.6 MMOL/L (ref 3.5–5)
SODIUM SERPL-SCNC: 142 MMOL/L (ref 136–145)

## 2021-02-22 VITALS — DIASTOLIC BLOOD PRESSURE: 78 MMHG | SYSTOLIC BLOOD PRESSURE: 154 MMHG

## 2022-03-28 ENCOUNTER — LAB REQUISITION (OUTPATIENT)
Dept: LAB | Facility: CLINIC | Age: 82
End: 2022-03-28

## 2022-03-28 DIAGNOSIS — I10 ESSENTIAL (PRIMARY) HYPERTENSION: ICD-10-CM

## 2022-03-28 LAB
ANION GAP SERPL CALCULATED.3IONS-SCNC: 11 MMOL/L (ref 5–18)
BUN SERPL-MCNC: 13 MG/DL (ref 8–28)
CALCIUM SERPL-MCNC: 10.3 MG/DL (ref 8.5–10.5)
CHLORIDE BLD-SCNC: 103 MMOL/L (ref 98–107)
CO2 SERPL-SCNC: 26 MMOL/L (ref 22–31)
CREAT SERPL-MCNC: 0.82 MG/DL (ref 0.6–1.1)
GFR SERPL CREATININE-BSD FRML MDRD: 72 ML/MIN/1.73M2
GLUCOSE BLD-MCNC: 101 MG/DL (ref 70–125)
POTASSIUM BLD-SCNC: 4.1 MMOL/L (ref 3.5–5)
SODIUM SERPL-SCNC: 140 MMOL/L (ref 136–145)

## 2022-03-28 PROCEDURE — 80048 BASIC METABOLIC PNL TOTAL CA: CPT | Performed by: FAMILY MEDICINE

## 2022-10-21 ENCOUNTER — LAB REQUISITION (OUTPATIENT)
Dept: LAB | Facility: CLINIC | Age: 82
End: 2022-10-21

## 2022-10-21 DIAGNOSIS — Z13.220 ENCOUNTER FOR SCREENING FOR LIPOID DISORDERS: ICD-10-CM

## 2022-10-21 LAB
CHOLEST SERPL-MCNC: 193 MG/DL
HDLC SERPL-MCNC: 69 MG/DL
LDLC SERPL CALC-MCNC: 101 MG/DL
NONHDLC SERPL-MCNC: 124 MG/DL
TRIGL SERPL-MCNC: 115 MG/DL

## 2022-10-21 PROCEDURE — 80061 LIPID PANEL: CPT | Performed by: FAMILY MEDICINE

## 2022-12-09 ENCOUNTER — HOSPITAL ENCOUNTER (OUTPATIENT)
Dept: BONE DENSITY | Facility: HOSPITAL | Age: 82
Discharge: HOME | End: 2022-12-09
Attending: FAMILY MEDICINE | Admitting: FAMILY MEDICINE
Payer: COMMERCIAL

## 2022-12-09 DIAGNOSIS — Z78.0 MENOPAUSE: ICD-10-CM

## 2022-12-09 PROCEDURE — 77080 DXA BONE DENSITY AXIAL: CPT

## 2023-03-30 ENCOUNTER — LAB REQUISITION (OUTPATIENT)
Dept: LAB | Facility: CLINIC | Age: 83
End: 2023-03-30

## 2023-03-30 DIAGNOSIS — I10 ESSENTIAL (PRIMARY) HYPERTENSION: ICD-10-CM

## 2023-03-30 LAB
ANION GAP SERPL CALCULATED.3IONS-SCNC: 12 MMOL/L (ref 7–15)
BUN SERPL-MCNC: 13.3 MG/DL (ref 8–23)
CALCIUM SERPL-MCNC: 10.3 MG/DL (ref 8.8–10.2)
CHLORIDE SERPL-SCNC: 103 MMOL/L (ref 98–107)
CREAT SERPL-MCNC: 0.82 MG/DL (ref 0.51–0.95)
DEPRECATED HCO3 PLAS-SCNC: 26 MMOL/L (ref 22–29)
GFR SERPL CREATININE-BSD FRML MDRD: 72 ML/MIN/1.73M2
GLUCOSE SERPL-MCNC: 98 MG/DL (ref 70–99)
POTASSIUM SERPL-SCNC: 3.7 MMOL/L (ref 3.4–5.3)
SODIUM SERPL-SCNC: 141 MMOL/L (ref 136–145)

## 2023-03-30 PROCEDURE — 82310 ASSAY OF CALCIUM: CPT | Performed by: FAMILY MEDICINE

## 2023-04-03 ENCOUNTER — LAB REQUISITION (OUTPATIENT)
Dept: LAB | Facility: CLINIC | Age: 83
End: 2023-04-03

## 2023-04-03 DIAGNOSIS — E83.52 HYPERCALCEMIA: ICD-10-CM

## 2023-04-03 LAB — CALCIUM SERPL-MCNC: 10 MG/DL (ref 8.8–10.2)

## 2023-04-03 PROCEDURE — 82310 ASSAY OF CALCIUM: CPT | Performed by: FAMILY MEDICINE

## 2023-10-05 ENCOUNTER — ANCILLARY PROCEDURE (OUTPATIENT)
Dept: MAMMOGRAPHY | Facility: HOSPITAL | Age: 83
End: 2023-10-05
Attending: FAMILY MEDICINE
Payer: COMMERCIAL

## 2023-10-05 DIAGNOSIS — Z12.31 VISIT FOR SCREENING MAMMOGRAM: ICD-10-CM

## 2023-10-05 PROCEDURE — 77067 SCR MAMMO BI INCL CAD: CPT

## 2024-04-25 ENCOUNTER — LAB REQUISITION (OUTPATIENT)
Dept: LAB | Facility: CLINIC | Age: 84
End: 2024-04-25

## 2024-04-25 DIAGNOSIS — I10 ESSENTIAL (PRIMARY) HYPERTENSION: ICD-10-CM

## 2024-04-25 PROCEDURE — 82565 ASSAY OF CREATININE: CPT | Performed by: FAMILY MEDICINE

## 2024-04-25 PROCEDURE — 82374 ASSAY BLOOD CARBON DIOXIDE: CPT | Performed by: FAMILY MEDICINE

## 2024-04-26 LAB
ANION GAP SERPL CALCULATED.3IONS-SCNC: 16 MMOL/L (ref 7–15)
BUN SERPL-MCNC: 15.5 MG/DL (ref 8–23)
CALCIUM SERPL-MCNC: 9.9 MG/DL (ref 8.8–10.2)
CHLORIDE SERPL-SCNC: 102 MMOL/L (ref 98–107)
CREAT SERPL-MCNC: 0.85 MG/DL (ref 0.51–0.95)
DEPRECATED HCO3 PLAS-SCNC: 20 MMOL/L (ref 22–29)
EGFRCR SERPLBLD CKD-EPI 2021: 68 ML/MIN/1.73M2
GLUCOSE SERPL-MCNC: 107 MG/DL (ref 70–99)
POTASSIUM SERPL-SCNC: 4.8 MMOL/L (ref 3.4–5.3)
SODIUM SERPL-SCNC: 138 MMOL/L (ref 135–145)

## 2025-01-28 ENCOUNTER — LAB REQUISITION (OUTPATIENT)
Dept: LAB | Facility: CLINIC | Age: 85
End: 2025-01-28
Payer: COMMERCIAL

## 2025-01-28 DIAGNOSIS — I10 ESSENTIAL (PRIMARY) HYPERTENSION: ICD-10-CM

## 2025-01-29 LAB
ANION GAP SERPL CALCULATED.3IONS-SCNC: 9 MMOL/L (ref 7–15)
BUN SERPL-MCNC: 11 MG/DL (ref 8–23)
CALCIUM SERPL-MCNC: 10.4 MG/DL (ref 8.8–10.4)
CHLORIDE SERPL-SCNC: 103 MMOL/L (ref 98–107)
CREAT SERPL-MCNC: 0.84 MG/DL (ref 0.51–0.95)
CREAT UR-MCNC: 119 MG/DL
EGFRCR SERPLBLD CKD-EPI 2021: 69 ML/MIN/1.73M2
GLUCOSE SERPL-MCNC: 106 MG/DL (ref 70–99)
HCO3 SERPL-SCNC: 28 MMOL/L (ref 22–29)
MICROALBUMIN UR-MCNC: <12 MG/L
MICROALBUMIN/CREAT UR: NORMAL MG/G{CREAT}
POTASSIUM SERPL-SCNC: 3.8 MMOL/L (ref 3.4–5.3)
SODIUM SERPL-SCNC: 140 MMOL/L (ref 135–145)

## 2025-04-14 ENCOUNTER — ANCILLARY PROCEDURE (OUTPATIENT)
Dept: MAMMOGRAPHY | Facility: HOSPITAL | Age: 85
End: 2025-04-14
Attending: FAMILY MEDICINE
Payer: COMMERCIAL

## 2025-04-14 DIAGNOSIS — Z12.31 VISIT FOR SCREENING MAMMOGRAM: ICD-10-CM

## 2025-04-14 PROCEDURE — 77067 SCR MAMMO BI INCL CAD: CPT

## 2025-04-14 PROCEDURE — 77063 BREAST TOMOSYNTHESIS BI: CPT

## 2025-07-09 ENCOUNTER — HOSPITAL ENCOUNTER (OUTPATIENT)
Dept: BONE DENSITY | Facility: HOSPITAL | Age: 85
Discharge: HOME | End: 2025-07-09
Attending: FAMILY MEDICINE
Payer: COMMERCIAL

## 2025-07-09 DIAGNOSIS — Z78.0 MENOPAUSE: ICD-10-CM

## 2025-07-09 PROCEDURE — 77091 TBS TECHL CALCULATION ONLY: CPT
